# Patient Record
Sex: MALE | Race: WHITE | NOT HISPANIC OR LATINO | Employment: FULL TIME | URBAN - METROPOLITAN AREA
[De-identification: names, ages, dates, MRNs, and addresses within clinical notes are randomized per-mention and may not be internally consistent; named-entity substitution may affect disease eponyms.]

---

## 2017-07-10 ENCOUNTER — OFFICE VISIT (OUTPATIENT)
Dept: URGENT CARE | Facility: URGENT CARE | Age: 22
End: 2017-07-10
Payer: COMMERCIAL

## 2017-07-10 VITALS
HEIGHT: 72 IN | DIASTOLIC BLOOD PRESSURE: 70 MMHG | SYSTOLIC BLOOD PRESSURE: 110 MMHG | BODY MASS INDEX: 25.73 KG/M2 | RESPIRATION RATE: 14 BRPM | WEIGHT: 190 LBS | HEART RATE: 76 BPM | OXYGEN SATURATION: 99 % | TEMPERATURE: 98.2 F

## 2017-07-10 DIAGNOSIS — S61.012A LACERATION OF LEFT THUMB WITHOUT FOREIGN BODY WITHOUT DAMAGE TO NAIL, INITIAL ENCOUNTER: ICD-10-CM

## 2017-07-10 DIAGNOSIS — Z23 NEED FOR VACCINATION: Primary | ICD-10-CM

## 2017-07-10 PROCEDURE — 99202 OFFICE O/P NEW SF 15 MIN: CPT | Mod: 25 | Performed by: HOSPITALIST

## 2017-07-10 PROCEDURE — 90471 IMMUNIZATION ADMIN: CPT | Performed by: HOSPITALIST

## 2017-07-10 PROCEDURE — 90715 TDAP VACCINE 7 YRS/> IM: CPT | Performed by: HOSPITALIST

## 2017-07-10 RX ORDER — CEPHALEXIN 500 MG/1
500 CAPSULE ORAL 4 TIMES DAILY
Qty: 40 CAPSULE | Refills: 0 | Status: SHIPPED | OUTPATIENT
Start: 2017-07-10

## 2017-07-10 NOTE — MR AVS SNAPSHOT
"              After Visit Summary   7/10/2017    Alex Charles    MRN: [id number]           Patient Information     Date Of Birth          1995        Visit Information        Provider Department      7/10/2017 8:20 PM Ana Renteria MD [facility]        Today's Diagnoses     Need for vaccination    -  1    Laceration of left thumb without foreign body without damage to nail, initial encounter           Follow-ups after your visit        Who to contact     If you have questions or need follow up information about today's clinic visit or your schedule please contact [facility] directly at [phone].  Normal or non-critical lab and imaging results will be communicated to you by [employer], letter or phone within 4 business days after the clinic has received the results. If you do not hear from us within 7 days, please contact the clinic through [employer] or phone. If you have a critical or abnormal lab result, we will notify you by phone as soon as possible.  Submit refill requests through [employer] or call your pharmacy and they will forward the refill request to us. Please allow 3 business days for your refill to be completed.          Additional Information About Your Visit        MyChart Information     [employer] lets you send messages to your doctor, view your test results, renew your prescriptions, schedule appointments and more. To sign up, go to [email or web address] . Click on \"Log in\" on the left side of the screen, which will take you to the Welcome page. Then click on \"Sign up Now\" on the right side of the page.     You will be asked to enter the access code listed below, as well as some personal information. Please follow the directions to create your username and password.     Your access code is: [email or web address]  Expires: 10/8/2017  9:15 PM     Your access code will  in 90 days. If you need help or a new code, please call your [place] clinic or " [phone].        Care EveryWhere ID     This is your Care EveryWhere ID. This could be used by other organizations to access your [place] medical records  [id number]        Your Vitals Were     Pulse Temperature Respirations Height Pulse Oximetry BMI (Body Mass Index)    76 98.2  F (36.8  C) (Oral) 14 6' (1.829 m) 99% 25.77 kg/m2       Blood Pressure from Last 3 Encounters:   07/10/17 110/70    Weight from Last 3 Encounters:   07/10/17 190 lb (86.2 kg)              We Performed the Following     ADMIN 1st VACCINE     TDAP VACCINE (ADACEL)          Today's Medication Changes          These changes are accurate as of: 7/10/17  9:15 PM.  If you have any questions, ask your nurse or doctor.               Start taking these medicines.        Dose/Directions    cephALEXin 500 MG capsule   Commonly known as:  KEFLEX   Used for:  Laceration of left thumb without foreign body without damage to nail, initial encounter   Started by:  Ana Renteria MD        Dose:  500 mg   Take 1 capsule (500 mg) by mouth 4 times daily   Quantity:  40 capsule   Refills:  0            Where to get your medicines      Some of these will need a paper prescription and others can be bought over the counter.  Ask your nurse if you have questions.     Bring a paper prescription for each of these medications     cephALEXin 500 MG capsule                Primary Care Provider    None Doctor, MD       No address on file        Equal Access to Services     [facility] AH: Hadii corrina duarte Somelly, waaxda luqadaha, qaybta kaalmaaby interiano, ivonne marie. So [facility] [phone].    ATENCIÓN: Si habla español, tiene a beavers disposición servicios gratuitos de asistencia lingüística. Llame al [phone].    We comply with applicable federal civil rights laws and [place] laws. We do not discriminate on the basis of race, color, national origin, age, disability sex, sexual orientation or gender identity.             Thank you!     Thank you for choosing [facility]  for your care. Our goal is always to provide you with excellent care. Hearing back from our patients is one way we can continue to improve our services. Please take a few minutes to complete the written survey that you may receive in the mail after your visit with us. Thank you!             Your Updated Medication List - Protect others around you: Learn how to safely use, store and throw away your medicines at www.disposemymeds.org.          This list is accurate as of: 7/10/17  9:15 PM.  Always use your most recent med list.                   Brand Name Dispense Instructions for use Diagnosis    cephALEXin 500 MG capsule    KEFLEX    40 capsule    Take 1 capsule (500 mg) by mouth 4 times daily    Laceration of left thumb without foreign body without damage to nail, initial encounter

## 2017-07-11 NOTE — PROGRESS NOTES
Pt came here due to pt cut his left thumb when he was cooking, apparently he apply gorrila glue on that area to stop the bleeding, currently the bleeding stop, he is not sure about his tetanus shot status, as per him the knife is clean.     No Known Allergies    No past medical history on file.      No current outpatient prescriptions on file prior to visit.  No current facility-administered medications on file prior to visit.     Social History   Substance Use Topics     Smoking status: Former Smoker     Smokeless tobacco: Not on file     Alcohol use Not on file       ROS:  Consitutional: As above  ENT: As above  Respiratory: As above    OBJECTIVE:  /70 (BP Location: Right arm, Cuff Size: Adult Large)  Pulse 76  Temp 98.2  F (36.8  C) (Oral)  Resp 14  Ht 6' (1.829 m)  Wt 190 lb (86.2 kg)  SpO2 99%  BMI 25.77 kg/m2  GENERAL APPEARANCE: healthy, alert and minimal distress  On thumb exam i do noted the area of the laceration but it has been covered with the power glue that he apply and the glue seem to be mixed with blood     No results found for this or any previous visit (from the past 168 hour(s)).     ASSESSMENT:     ICD-10-CM    1. Need for vaccination Z23 TDAP VACCINE (ADACEL)     ADMIN 1st VACCINE   2. Laceration of left thumb without foreign body without damage to nail, initial encounter S61.012A cephALEXin (KEFLEX) 500 MG capsule         PLAN:    Will give tetanus shot, will give prescription of keflex, recommend not to start it and only buy the prescription if there is sign of infection.   Patient understand and agreeable with this plan, all question answered.    tylenol and ibuprofen prn for pain.     Ana Renteria MD

## 2017-07-11 NOTE — NURSING NOTE
Alex Charles is a 22 year old male.      Chief Complaint   Patient presents with     Urgent Care     Laceration     pt is here for a cut to his L 1st finger - cut with a knife at home - need tdap        Initial /70 (BP Location: Right arm, Cuff Size: Adult Large)  Pulse 76  Temp 98.2  F (36.8  C) (Oral)  Resp 14  Ht 6' (1.829 m)  Wt 190 lb (86.2 kg)  SpO2 99%  BMI 25.77 kg/m2 Estimated body mass index is 25.77 kg/(m^2) as calculated from the following:    Height as of this encounter: 6' (1.829 m).    Weight as of this encounter: 190 lb (86.2 kg).  Medication Reconciliation: complete      Questioned patient about current smoking habits.  Pt. quit smoking some time ago.      Pastora Jordan CMA

## 2023-10-28 ENCOUNTER — OFFICE VISIT (OUTPATIENT)
Dept: URGENT CARE | Facility: URGENT CARE | Age: 28
End: 2023-10-28
Payer: COMMERCIAL

## 2023-10-28 VITALS
DIASTOLIC BLOOD PRESSURE: 74 MMHG | HEART RATE: 97 BPM | WEIGHT: 210 LBS | BODY MASS INDEX: 28.48 KG/M2 | OXYGEN SATURATION: 97 % | TEMPERATURE: 98.9 F | SYSTOLIC BLOOD PRESSURE: 124 MMHG

## 2023-10-28 DIAGNOSIS — J01.00 ACUTE NON-RECURRENT MAXILLARY SINUSITIS: ICD-10-CM

## 2023-10-28 DIAGNOSIS — H66.001 NON-RECURRENT ACUTE SUPPURATIVE OTITIS MEDIA OF RIGHT EAR WITHOUT SPONTANEOUS RUPTURE OF TYMPANIC MEMBRANE: Primary | ICD-10-CM

## 2023-10-28 PROCEDURE — 99203 OFFICE O/P NEW LOW 30 MIN: CPT | Performed by: PHYSICIAN ASSISTANT

## 2023-10-28 RX ORDER — FLUTICASONE PROPIONATE 50 MCG
1-2 SPRAY, SUSPENSION (ML) NASAL DAILY
Qty: 9.9 ML | Refills: 0 | Status: SHIPPED | OUTPATIENT
Start: 2023-10-28

## 2023-10-28 NOTE — PROGRESS NOTES
Assessment & Plan     Non-recurrent acute suppurative otitis media of right ear without spontaneous rupture of tympanic membrane  Augmentin is prescribed today.  Tylenol Motrin as needed for pain.  Patient educational information provided regarding course of symptoms.  Follow-up if any worsening symptoms.  Patient agrees with the plan.  - amoxicillin-clavulanate (AUGMENTIN) 875-125 MG tablet  Dispense: 20 tablet; Refill: 0    Acute non-recurrent maxillary sinusitis  Discussed viral sinusitis at this time.  Flonase nasal spray prescribed to help with nasal passages inflammation.  Tylenol/Motrin as needed for headache.  Push fluids.  Rest.  Follow-up if any worsening symptoms.  Patient agrees with the plan.  - fluticasone (FLONASE) 50 MCG/ACT nasal spray  Dispense: 9.9 mL; Refill: 0       Return in about 10 days (around 11/7/2023) for Symptoms failing to improve.    Sarita Seth PA-C  [facility] is a 28 year old male who presents to clinic today for the following health issues:  Chief Complaint   Patient presents with    Urgent Care     X3 days right ear pain, facial pain, jaw pain, sinus pressure, runny nose, headache, mild cough, teeth pain,  Taking tylenol cold and sinus, benadryl      HPI    He is presenting to urgent care today with a complaint of right ear pain, sinus pressure and pain, jaw pain, nasal congestion, headache, cough.  Onset of symptoms 3 days ago.  Right ear pain is worsening.  No fever.  Treatment tried: Tylenol, Benadryl      Review of Systems  Constitutional, HEENT, cardiovascular, pulmonary, GI, , musculoskeletal, neuro, skin, endocrine and psych systems are negative, except as otherwise noted.      Objective    /74   Pulse 97   Temp 98.9  F (37.2  C) (Oral)   Wt 95.3 kg (210 lb)   SpO2 97%   BMI 28.48 kg/m    Physical Exam   GENERAL: healthy, alert and no distress  HENT: ear canals normal, right TM is bulging and erythematous,  nose with boggy turbinates, maxillary sinuses are tender to percussion, mouth without ulcers or lesions, throat is moist and pink  RESP: lungs clear to auscultation - no rales, rhonchi or wheezes  CV: regular rate and rhythm, normal S1 S2  MS: no gross musculoskeletal defects noted, no edema